# Patient Record
Sex: FEMALE | Race: BLACK OR AFRICAN AMERICAN | NOT HISPANIC OR LATINO | Employment: FULL TIME | URBAN - METROPOLITAN AREA
[De-identification: names, ages, dates, MRNs, and addresses within clinical notes are randomized per-mention and may not be internally consistent; named-entity substitution may affect disease eponyms.]

---

## 2019-01-18 ENCOUNTER — HISTORICAL (OUTPATIENT)
Dept: RADIOLOGY | Facility: HOSPITAL | Age: 39
End: 2019-01-18

## 2019-01-23 ENCOUNTER — HISTORICAL (OUTPATIENT)
Dept: LAB | Facility: HOSPITAL | Age: 39
End: 2019-01-23

## 2019-01-23 LAB
ABS NEUT (OLG): 8.8 X10(3)/MCL (ref 1.5–6.9)
BASOPHILS # BLD AUTO: 0 X10(3)/MCL (ref 0–0.1)
BASOPHILS NFR BLD AUTO: 0 % (ref 0–1)
EOSINOPHIL # BLD AUTO: 0 X10(3)/MCL (ref 0–0.6)
EOSINOPHIL NFR BLD AUTO: 0 % (ref 0–5)
ERYTHROCYTE [DISTWIDTH] IN BLOOD BY AUTOMATED COUNT: 12.4 % (ref 11.5–17)
HCT VFR BLD AUTO: 36.1 % (ref 36–48)
HGB BLD-MCNC: 11.5 GM/DL (ref 12–16)
IMM GRANULOCYTES # BLD AUTO: 0.05 10*3/UL (ref 0–0.02)
IMM GRANULOCYTES NFR BLD AUTO: 0.4 % (ref 0–0.43)
LYMPHOCYTES # BLD AUTO: 1.6 X10(3)/MCL (ref 0.5–4.1)
LYMPHOCYTES NFR BLD AUTO: 14 % (ref 15–40)
MCH RBC QN AUTO: 28 PG (ref 27–34)
MCHC RBC AUTO-ENTMCNC: 32 GM/DL (ref 31–36)
MCV RBC AUTO: 87 FL (ref 80–99)
MONOCYTES # BLD AUTO: 0.7 X10(3)/MCL (ref 0–1.1)
MONOCYTES NFR BLD AUTO: 7 % (ref 4–12)
NEUTROPHILS # BLD AUTO: 8.8 X10(3)/MCL (ref 1.5–6.9)
NEUTROPHILS NFR BLD AUTO: 78 % (ref 43–75)
PLATELET # BLD AUTO: 236 X10(3)/MCL (ref 140–400)
PMV BLD AUTO: 10.9 FL (ref 6.8–10)
RBC # BLD AUTO: 4.17 X10(6)/MCL (ref 4.2–5.4)
WBC # SPEC AUTO: 11.3 X10(3)/MCL (ref 4.5–11.5)

## 2019-02-20 ENCOUNTER — HISTORICAL (OUTPATIENT)
Dept: LAB | Facility: HOSPITAL | Age: 39
End: 2019-02-20

## 2019-02-20 LAB — ERYTHROCYTE [SEDIMENTATION RATE] IN BLOOD: 6 MM/HR (ref 0–20)

## 2023-10-26 ENCOUNTER — OFFICE VISIT (OUTPATIENT)
Dept: ORTHOPEDICS | Facility: CLINIC | Age: 43
End: 2023-10-26
Payer: COMMERCIAL

## 2023-10-26 ENCOUNTER — HOSPITAL ENCOUNTER (OUTPATIENT)
Dept: RADIOLOGY | Facility: CLINIC | Age: 43
Discharge: HOME | End: 2023-10-26
Attending: ORTHOPAEDIC SURGERY
Payer: COMMERCIAL

## 2023-10-26 VITALS — WEIGHT: 141 LBS | HEIGHT: 60 IN | BODY MASS INDEX: 27.68 KG/M2

## 2023-10-26 DIAGNOSIS — M25.511 CHRONIC RIGHT SHOULDER PAIN: ICD-10-CM

## 2023-10-26 DIAGNOSIS — S46.011A TRAUMATIC INCOMPLETE TEAR OF RIGHT ROTATOR CUFF, INITIAL ENCOUNTER: Primary | ICD-10-CM

## 2023-10-26 DIAGNOSIS — G89.29 CHRONIC RIGHT SHOULDER PAIN: ICD-10-CM

## 2023-10-26 DIAGNOSIS — M25.511 RIGHT SHOULDER PAIN, UNSPECIFIED CHRONICITY: ICD-10-CM

## 2023-10-26 PROCEDURE — 1159F PR MEDICATION LIST DOCUMENTED IN MEDICAL RECORD: ICD-10-PCS | Mod: CPTII,,, | Performed by: ORTHOPAEDIC SURGERY

## 2023-10-26 PROCEDURE — 99204 OFFICE O/P NEW MOD 45 MIN: CPT | Mod: ,,, | Performed by: ORTHOPAEDIC SURGERY

## 2023-10-26 PROCEDURE — 1160F PR REVIEW ALL MEDS BY PRESCRIBER/CLIN PHARMACIST DOCUMENTED: ICD-10-PCS | Mod: CPTII,,, | Performed by: ORTHOPAEDIC SURGERY

## 2023-10-26 PROCEDURE — 1159F MED LIST DOCD IN RCRD: CPT | Mod: CPTII,,, | Performed by: ORTHOPAEDIC SURGERY

## 2023-10-26 PROCEDURE — 99204 PR OFFICE/OUTPT VISIT, NEW, LEVL IV, 45-59 MIN: ICD-10-PCS | Mod: ,,, | Performed by: ORTHOPAEDIC SURGERY

## 2023-10-26 PROCEDURE — 73030 X-RAY EXAM OF SHOULDER: CPT | Mod: RT,,, | Performed by: ORTHOPAEDIC SURGERY

## 2023-10-26 PROCEDURE — 73030 XR SHOULDER COMPLETE 2 OR MORE VIEWS RIGHT: ICD-10-PCS | Mod: RT,,, | Performed by: ORTHOPAEDIC SURGERY

## 2023-10-26 PROCEDURE — 1160F RVW MEDS BY RX/DR IN RCRD: CPT | Mod: CPTII,,, | Performed by: ORTHOPAEDIC SURGERY

## 2023-10-26 PROCEDURE — 3008F BODY MASS INDEX DOCD: CPT | Mod: CPTII,,, | Performed by: ORTHOPAEDIC SURGERY

## 2023-10-26 PROCEDURE — 3008F PR BODY MASS INDEX (BMI) DOCUMENTED: ICD-10-PCS | Mod: CPTII,,, | Performed by: ORTHOPAEDIC SURGERY

## 2023-10-26 RX ORDER — BUSPIRONE HYDROCHLORIDE 5 MG/1
5 TABLET ORAL
COMMUNITY
Start: 2023-06-30

## 2023-10-26 RX ORDER — IBUPROFEN 800 MG/1
800 TABLET ORAL 3 TIMES DAILY PRN
Qty: 90 TABLET | Refills: 0 | Status: ON HOLD | OUTPATIENT
Start: 2023-10-26 | End: 2023-11-13 | Stop reason: HOSPADM

## 2023-10-26 RX ORDER — MONTELUKAST SODIUM 10 MG/1
1 TABLET ORAL DAILY PRN
COMMUNITY
Start: 2022-11-04

## 2023-10-26 RX ORDER — BUTALBITAL, ACETAMINOPHEN AND CAFFEINE 50; 325; 40 MG/1; MG/1; MG/1
1 TABLET ORAL EVERY 6 HOURS PRN
COMMUNITY
Start: 2023-06-30

## 2023-10-26 RX ORDER — TIZANIDINE 4 MG/1
4 TABLET ORAL EVERY 8 HOURS PRN
Qty: 42 TABLET | Refills: 0 | Status: SHIPPED | OUTPATIENT
Start: 2023-10-26 | End: 2023-11-09

## 2023-10-26 NOTE — LETTER
[facility]  [address] [place] [address]  Phone: [phone]  Fax: [phone]    Name:Renetta Kern  :1980   Date:10/26/2023     The above mentioned patient was seen by me on 10/26/23 and is able to return to work/school on 10/27/23 .         If you should have any questions, please contact my office at [phone].    Thank you,   Vaughn Morfin MD

## 2023-10-26 NOTE — PROGRESS NOTES
Orthopaedic Clinic  Orthopedic Clinic Note      Chief Complaint:   Chief Complaint   Patient presents with    Shoulder Pain     Right shoulder pain. No prior sx. Xr done today. 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 Pain is mainly in shoulder area and radiates down arm and states sometimes has numbness in the hand. Hurts to sleep on this shoulder and also to raise arm from side and reach back. Tried chiropractor which also did not give relief.     Referring Physician: No ref. provider found      History of Present Illness:    This is a 43 y.o. year old female presenting with complaints of right shoulder pain for a few years.  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  Her pain is most notable in the shoulder, however, it does radiate down her arm sometimes.  This shoulder pain is moderate to severe.  Patient reports increased pain with overhead movement.  Patient reports night pain.  Patient has been treated previously with multiple chiropractic manipulations, oral anti-inflammatories, and multiple oral corticosteroid prescriptions.  She remains symptomatic despite numerous conservative treatments.      History reviewed. No pertinent past medical history.    History reviewed. No pertinent surgical history.    Current Outpatient Medications   Medication Sig    busPIRone (BUSPAR) 5 MG Tab Take 5 mg by mouth 2 (two) times daily.    butalbital-acetaminophen-caffeine -40 mg (FIORICET, ESGIC) -40 mg per tablet Take 1 tablet by mouth every 6 (six) hours as needed.    montelukast (SINGULAIR) 10 mg tablet Take 1 tablet by mouth once daily.    ibuprofen (ADVIL,MOTRIN) 800 MG tablet Take 1 tablet (800 mg total) by mouth 3 (three) times daily as needed for Pain.    tiZANidine (ZANAFLEX) 4 MG tablet Take 1 tablet (4 mg total) by mouth every 8 (eight) hours  as needed (muscle spasms).     No current facility-administered medications for this visit.       Review of patient's allergies indicates:  No Known Allergies    Family History   Problem Relation Age of Onset    No Known Problems Mother     No Known Problems Father        Social History     Socioeconomic History    Marital status:    Tobacco Use    Smoking status: Never    Smokeless tobacco: Never   Substance and Sexual Activity    Alcohol use: Never    Drug use: Never           Review of Systems:  All review of systems negative except for those stated in the HPI.    Examination:    Vital Signs:    Vitals:    10/26/23 0832   Weight: 64 kg (141 lb)   Height: 5' (1.524 m)       Body mass index is 27.54 kg/m².    Physical Examination:  General: Well-developed, well-nourished.  Neuro: Alert and oriented x 3.  Psych: Normal mood and affect.  Card: Regular rate and rhythm  Resp: Respirations regular and unlabored  Right Shoulder Exam:  No obvious deformity. No medial or lateral scapula winging. Forward flexion to 140 degrees and abduction to 140 degrees. Positive empty can, positive Whipple, Positive drop arm test, Sawant, impingement, Positive AC joint tenderness. Negative biceps groove tenderness, Positive Talavera´s, Yergason´s, Speed test. Negative Apprehension and Relocation test. 4/5 strength, normal skin appearance and palpable pulses distally. Sensibility normal.       Imaging: X-rays ordered and images interpreted today personally by me of four views of the right shoulder demonstrate mild superior migration of the humeral head.      Assessment: Traumatic incomplete tear of right rotator cuff, initial encounter  -     X-ray Shoulder 2 or More Views Right; Future; Expected date: 10/26/2023  -     ibuprofen (ADVIL,MOTRIN) 800 MG tablet; Take 1 tablet (800 mg total) by mouth 3 (three) times daily as needed for Pain.  Dispense: 90 tablet; Refill: 0  -     tiZANidine (ZANAFLEX) 4 MG tablet; Take 1 tablet (4 mg  total) by mouth every 8 (eight) hours as needed (muscle spasms).  Dispense: 42 tablet; Refill: 0  -     MRI Shoulder Without Contrast Right; Future; Expected date: 11/02/2023    Chronic right shoulder pain  -     MRI Shoulder Without Contrast Right; Future; Expected date: 11/02/2023        Plan:  X-rays were reviewed with the patient.  Due to the chronic nature of her symptoms and failure to respond to conservative treatments, order entered for MRI of the right shoulder to assess for suspected rotator cuff pathology.  Prescriptions routed for ibuprofen 800 mg to be taken as needed for pain and tizanidine to be taken as needed for muscle spasms.  Patient was advised to try tizanidine to assess for drowsiness prior to attempting to take this medication when needing to drive.  She will avoid all other over-the-counter anti-inflammatories.  She will return to clinic for review of MRI results once imaging is obtained.  She verbalized understanding of the plan of care with no further questions.    Vaughn Morfin MD personally performed the services described in this documentation, including but not limited to patient's history, physical examination, and assessment and plan of care. All medical record entries made by KELSEY Rodrigez were performed at his direction and in his presence. The medical record was reviewed and is accurate and complete.         Follow up for Review of MRI results.      DISCLAIMER: This note may have been dictated using voice recognition software and may contain grammatical errors.     NOTE: Consult report sent to referring provider via [employer].

## 2023-11-07 ENCOUNTER — LAB VISIT (OUTPATIENT)
Dept: LAB | Facility: HOSPITAL | Age: 43
End: 2023-11-07
Attending: ORTHOPAEDIC SURGERY
Payer: COMMERCIAL

## 2023-11-07 ENCOUNTER — ANESTHESIA EVENT (OUTPATIENT)
Dept: SURGERY | Facility: HOSPITAL | Age: 43
End: 2023-11-07
Payer: COMMERCIAL

## 2023-11-07 ENCOUNTER — OFFICE VISIT (OUTPATIENT)
Dept: ORTHOPEDICS | Facility: CLINIC | Age: 43
End: 2023-11-07
Payer: COMMERCIAL

## 2023-11-07 VITALS — WEIGHT: 141 LBS | BODY MASS INDEX: 27.68 KG/M2 | HEIGHT: 60 IN

## 2023-11-07 DIAGNOSIS — M19.011 ARTHROSIS OF RIGHT ACROMIOCLAVICULAR JOINT: ICD-10-CM

## 2023-11-07 DIAGNOSIS — M75.21 RIGHT BICIPITAL TENOSYNOVITIS: ICD-10-CM

## 2023-11-07 DIAGNOSIS — S46.011A TRAUMATIC INCOMPLETE TEAR OF RIGHT ROTATOR CUFF, INITIAL ENCOUNTER: Primary | ICD-10-CM

## 2023-11-07 DIAGNOSIS — S46.011A TRAUMATIC INCOMPLETE TEAR OF RIGHT ROTATOR CUFF, INITIAL ENCOUNTER: ICD-10-CM

## 2023-11-07 LAB
ALBUMIN SERPL-MCNC: 4 G/DL (ref 3.5–5)
ALBUMIN/GLOB SERPL: 1 RATIO (ref 1.1–2)
ALP SERPL-CCNC: 60 UNIT/L (ref 40–150)
ALT SERPL-CCNC: 27 UNIT/L (ref 0–55)
AST SERPL-CCNC: 21 UNIT/L (ref 5–34)
BASOPHILS # BLD AUTO: 0.04 X10(3)/MCL
BASOPHILS NFR BLD AUTO: 0.4 %
BILIRUB SERPL-MCNC: 0.8 MG/DL
BUN SERPL-MCNC: 11.5 MG/DL (ref 7–18.7)
CALCIUM SERPL-MCNC: 8.9 MG/DL (ref 8.4–10.2)
CHLORIDE SERPL-SCNC: 107 MMOL/L (ref 98–107)
CO2 SERPL-SCNC: 24 MMOL/L (ref 22–29)
CREAT SERPL-MCNC: 0.71 MG/DL (ref 0.55–1.02)
EOSINOPHIL # BLD AUTO: 0.02 X10(3)/MCL (ref 0–0.9)
EOSINOPHIL NFR BLD AUTO: 0.2 %
ERYTHROCYTE [DISTWIDTH] IN BLOOD BY AUTOMATED COUNT: 11.7 % (ref 11.5–17)
GFR SERPLBLD CREATININE-BSD FMLA CKD-EPI: >60 MLS/MIN/1.73/M2
GLOBULIN SER-MCNC: 3.9 GM/DL (ref 2.4–3.5)
GLUCOSE SERPL-MCNC: 87 MG/DL (ref 74–100)
HCT VFR BLD AUTO: 37.6 % (ref 37–47)
HGB BLD-MCNC: 12.4 G/DL (ref 12–16)
IMM GRANULOCYTES # BLD AUTO: 0.04 X10(3)/MCL (ref 0–0.04)
IMM GRANULOCYTES NFR BLD AUTO: 0.4 %
LYMPHOCYTES # BLD AUTO: 2.34 X10(3)/MCL (ref 0.6–4.6)
LYMPHOCYTES NFR BLD AUTO: 22.1 %
MCH RBC QN AUTO: 27.6 PG (ref 27–31)
MCHC RBC AUTO-ENTMCNC: 33 G/DL (ref 33–36)
MCV RBC AUTO: 83.7 FL (ref 80–94)
MONOCYTES # BLD AUTO: 0.91 X10(3)/MCL (ref 0.1–1.3)
MONOCYTES NFR BLD AUTO: 8.6 %
NEUTROPHILS # BLD AUTO: 7.26 X10(3)/MCL (ref 2.1–9.2)
NEUTROPHILS NFR BLD AUTO: 68.3 %
NRBC BLD AUTO-RTO: 0 %
PLATELET # BLD AUTO: 242 X10(3)/MCL (ref 130–400)
PMV BLD AUTO: 10.3 FL (ref 7.4–10.4)
POTASSIUM SERPL-SCNC: 3.9 MMOL/L (ref 3.5–5.1)
PROT SERPL-MCNC: 7.9 GM/DL (ref 6.4–8.3)
RBC # BLD AUTO: 4.49 X10(6)/MCL (ref 4.2–5.4)
SODIUM SERPL-SCNC: 138 MMOL/L (ref 136–145)
WBC # SPEC AUTO: 10.61 X10(3)/MCL (ref 4.5–11.5)

## 2023-11-07 PROCEDURE — 85025 COMPLETE CBC W/AUTO DIFF WBC: CPT

## 2023-11-07 PROCEDURE — 36415 COLL VENOUS BLD VENIPUNCTURE: CPT

## 2023-11-07 PROCEDURE — 80053 COMPREHEN METABOLIC PANEL: CPT

## 2023-11-07 PROCEDURE — 3008F PR BODY MASS INDEX (BMI) DOCUMENTED: ICD-10-PCS | Mod: CPTII,,, | Performed by: ORTHOPAEDIC SURGERY

## 2023-11-07 PROCEDURE — 99214 PR OFFICE/OUTPT VISIT, EST, LEVL IV, 30-39 MIN: ICD-10-PCS | Mod: ,,, | Performed by: ORTHOPAEDIC SURGERY

## 2023-11-07 PROCEDURE — 99214 OFFICE O/P EST MOD 30 MIN: CPT | Mod: ,,, | Performed by: ORTHOPAEDIC SURGERY

## 2023-11-07 PROCEDURE — 3008F BODY MASS INDEX DOCD: CPT | Mod: CPTII,,, | Performed by: ORTHOPAEDIC SURGERY

## 2023-11-07 RX ORDER — KETOROLAC TROMETHAMINE 10 MG/1
10 TABLET, FILM COATED ORAL ONCE
Status: CANCELLED | OUTPATIENT
Start: 2023-11-07 | End: 2023-11-12

## 2023-11-07 RX ORDER — SCOLOPAMINE TRANSDERMAL SYSTEM 1 MG/1
1 PATCH, EXTENDED RELEASE TRANSDERMAL ONCE AS NEEDED
Status: CANCELLED | OUTPATIENT
Start: 2023-11-07 | End: 2035-04-05

## 2023-11-07 RX ORDER — ALBUTEROL SULFATE 0.63 MG/3ML
0.63 SOLUTION RESPIRATORY (INHALATION) EVERY 6 HOURS PRN
COMMUNITY

## 2023-11-07 RX ORDER — ACETAMINOPHEN 500 MG
1000 TABLET ORAL
Status: CANCELLED | OUTPATIENT
Start: 2023-11-07

## 2023-11-07 RX ORDER — SODIUM CHLORIDE, SODIUM GLUCONATE, SODIUM ACETATE, POTASSIUM CHLORIDE AND MAGNESIUM CHLORIDE 30; 37; 368; 526; 502 MG/100ML; MG/100ML; MG/100ML; MG/100ML; MG/100ML
INJECTION, SOLUTION INTRAVENOUS CONTINUOUS
Status: CANCELLED | OUTPATIENT
Start: 2023-11-07

## 2023-11-07 RX ORDER — GABAPENTIN 100 MG/1
300 CAPSULE ORAL
Status: CANCELLED | OUTPATIENT
Start: 2023-11-07

## 2023-11-07 NOTE — PROGRESS NOTES
Orthopaedic Clinic  Orthopedic Clinic Note      Chief Complaint:   Chief Complaint   Patient presents with    Results     Mri results right shoulder.      Referring Physician: No ref. provider found      History of Present Illness:    This is a 43 y.o. year old female presenting with complaints of right shoulder pain for a few years.  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  Her pain is most notable in the shoulder, however, it does radiate down her arm sometimes.  This shoulder pain is moderate to severe.  Patient reports increased pain with overhead movement.  Patient reports night pain.  Patient has been treated previously with multiple chiropractic manipulations, oral anti-inflammatories, and multiple oral corticosteroid prescriptions.  She remains symptomatic despite numerous conservative treatments.  11/07/2023 Patient presents for review of right shoulder MRI results.  MRI of the right shoulder demonstrated rotator cuff thinning with interstitial tearing and tendinosis, as well as bicipital tendonitis.  Her symptoms are unchanged since her prior visit.      History reviewed. No pertinent past medical history.    History reviewed. No pertinent surgical history.    Current Outpatient Medications   Medication Sig    busPIRone (BUSPAR) 5 MG Tab Take 5 mg by mouth 2 (two) times daily.    butalbital-acetaminophen-caffeine -40 mg (FIORICET, ESGIC) -40 mg per tablet Take 1 tablet by mouth every 6 (six) hours as needed.    ibuprofen (ADVIL,MOTRIN) 800 MG tablet Take 1 tablet (800 mg total) by mouth 3 (three) times daily as needed for Pain.    montelukast (SINGULAIR) 10 mg tablet Take 1 tablet by mouth once daily.    tiZANidine (ZANAFLEX) 4 MG tablet Take 1 tablet (4 mg total) by mouth every 8 (eight) hours as needed (muscle spasms).     No current facility-administered medications for this visit.       Review  of patient's allergies indicates:   Allergen Reactions    Iodine Rash and Other (See Comments)     Blisters       Family History   Problem Relation Age of Onset    No Known Problems Mother     No Known Problems Father        Social History     Socioeconomic History    Marital status:    Tobacco Use    Smoking status: Never    Smokeless tobacco: Never   Substance and Sexual Activity    Alcohol use: Never    Drug use: Never           Review of Systems:  All review of systems negative except for those stated in the HPI.    Examination:    Vital Signs:    Vitals:    11/07/23 1058   Weight: 64 kg (141 lb)   Height: 5' (1.524 m)       Body mass index is 27.54 kg/m².    Physical Examination:  General: Well-developed, well-nourished.  Neuro: Alert and oriented x 3.  Psych: Normal mood and affect.  Card: Regular rate and rhythm  Resp: Respirations regular and unlabored  Right Shoulder Exam:  No obvious deformity. No medial or lateral scapula winging. Forward flexion to 140 degrees and abduction to 140 degrees. Positive empty can, positive Whipple, Positive drop arm test, Sawant, impingement, Positive AC joint tenderness. Negative biceps groove tenderness, Positive Talavera´s, Yergason´s, Speed test. Negative Apprehension and Relocation test. 4/5 strength, normal skin appearance and palpable pulses distally. Sensibility normal.       Imaging: Prior four views of the right shoulder demonstrate mild superior migration of the humeral head.      Assessment: Traumatic incomplete tear of right rotator cuff, initial encounter  -     Place in Outpatient; Standing  -     Vital signs; Standing  -     Insert peripheral IV; Standing  -     Clip and Prep Other (please specifiy) (Operative site); Standing  -     Cleanse with Chlorhexidine (CHG); Standing  -     Diet NPO; Standing  -     POCT glucose; Standing  -     CBC auto differential; Future  -     Comprehensive metabolic panel; Future  -     Inpatient consult to Anesthesiology;  Standing  -     Case Request Operating Room: DEBRIDEMENT, ROTATOR CUFF, ARTHROSCOPIC, ARTHROSCOPY, SHOULDER, WITH DISTAL CLAVICLE EXCISION, ARTHROSCOPY,SHOULDER,WITH BICEPS TENODESIS  -     Place sequential compression device; Standing    Right bicipital tenosynovitis  -     Place in Outpatient; Standing  -     Vital signs; Standing  -     Insert peripheral IV; Standing  -     Clip and Prep Other (please specifiy) (Operative site); Standing  -     Cleanse with Chlorhexidine (CHG); Standing  -     Diet NPO; Standing  -     POCT glucose; Standing  -     CBC auto differential; Future  -     Comprehensive metabolic panel; Future  -     Inpatient consult to Anesthesiology; Standing  -     Case Request Operating Room: DEBRIDEMENT, ROTATOR CUFF, ARTHROSCOPIC, ARTHROSCOPY, SHOULDER, WITH DISTAL CLAVICLE EXCISION, ARTHROSCOPY,SHOULDER,WITH BICEPS TENODESIS  -     Place sequential compression device; Standing    Arthrosis of right acromioclavicular joint  -     Place in Outpatient; Standing  -     Vital signs; Standing  -     Insert peripheral IV; Standing  -     Clip and Prep Other (please specifiy) (Operative site); Standing  -     Cleanse with Chlorhexidine (CHG); Standing  -     Diet NPO; Standing  -     POCT glucose; Standing  -     CBC auto differential; Future  -     Comprehensive metabolic panel; Future  -     Inpatient consult to Anesthesiology; Standing  -     Case Request Operating Room: DEBRIDEMENT, ROTATOR CUFF, ARTHROSCOPIC, ARTHROSCOPY, SHOULDER, WITH DISTAL CLAVICLE EXCISION, ARTHROSCOPY,SHOULDER,WITH BICEPS TENODESIS  -     Place sequential compression device; Standing    Other orders  -     electrolyte-A infusion  -     IP VTE LOW RISK PATIENT; Standing  -     ceFAZolin (ANCEF) 2,000 mg in dextrose 5 % (D5W) 50 mL IVPB  -     acetaminophen tablet 1,000 mg  -     ketorolac tablet 10 mg  -     gabapentin capsule 300 mg  -     scopolamine 1.3-1.5 mg (1 mg over 3 days) 1 patch        Plan:  After much discussion  she is decided she wants to proceed with surgical intervention.  We will proceed with right shoulder arthroscopy procedure to include rotator cuff debridement due to the tendinosis and possible takedown repair due to the severe thinning of the rotator cuff measuring 2.2 mm, possible biceps tenodesis and distal clavicle resection. A surgical video explaining the surgery and the risk was reviewed by the patient.  The surgical procedure was explained to the patient in detail.  The patient acknowledges that they understood the risks, benefits, and other alternatives.  Patient signed an informed consent.  This patient will need a postoperative abduction pillow sling to unload the rotator cuff, labrum, and biceps tendon repair so that patient can heal properly.    Vaughn Morfin MD personally performed the services described in this documentation, including but not limited to patient's history, physical examination, and assessment and plan of care. All medical record entries made by KELSEY Rodrigez were performed at his direction and in his presence. The medical record was reviewed and is accurate and complete.         No follow-ups on file.      DISCLAIMER: This note may have been dictated using voice recognition software and may contain grammatical errors.     NOTE: Consult report sent to referring provider via [employer].

## 2023-11-07 NOTE — H&P (VIEW-ONLY)
Orthopaedic Clinic  Orthopedic Clinic Note      Chief Complaint:   Chief Complaint   Patient presents with    Results     Mri results right shoulder.      Referring Physician: No ref. provider found      History of Present Illness:    This is a 43 y.o. year old female presenting with complaints of right shoulder pain for a few years.  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  Her pain is most notable in the shoulder, however, it does radiate down her arm sometimes.  This shoulder pain is moderate to severe.  Patient reports increased pain with overhead movement.  Patient reports night pain.  Patient has been treated previously with multiple chiropractic manipulations, oral anti-inflammatories, and multiple oral corticosteroid prescriptions.  She remains symptomatic despite numerous conservative treatments.  11/07/2023 Patient presents for review of right shoulder MRI results.  MRI of the right shoulder demonstrated rotator cuff thinning with interstitial tearing and tendinosis, as well as bicipital tendonitis.  Her symptoms are unchanged since her prior visit.      History reviewed. No pertinent past medical history.    History reviewed. No pertinent surgical history.    Current Outpatient Medications   Medication Sig    busPIRone (BUSPAR) 5 MG Tab Take 5 mg by mouth 2 (two) times daily.    butalbital-acetaminophen-caffeine -40 mg (FIORICET, ESGIC) -40 mg per tablet Take 1 tablet by mouth every 6 (six) hours as needed.    ibuprofen (ADVIL,MOTRIN) 800 MG tablet Take 1 tablet (800 mg total) by mouth 3 (three) times daily as needed for Pain.    montelukast (SINGULAIR) 10 mg tablet Take 1 tablet by mouth once daily.    tiZANidine (ZANAFLEX) 4 MG tablet Take 1 tablet (4 mg total) by mouth every 8 (eight) hours as needed (muscle spasms).     No current facility-administered medications for this visit.       Review  of patient's allergies indicates:   Allergen Reactions    Iodine Rash and Other (See Comments)     Blisters       Family History   Problem Relation Age of Onset    No Known Problems Mother     No Known Problems Father        Social History     Socioeconomic History    Marital status:    Tobacco Use    Smoking status: Never    Smokeless tobacco: Never   Substance and Sexual Activity    Alcohol use: Never    Drug use: Never           Review of Systems:  All review of systems negative except for those stated in the HPI.    Examination:    Vital Signs:    Vitals:    11/07/23 1058   Weight: 64 kg (141 lb)   Height: 5' (1.524 m)       Body mass index is 27.54 kg/m².    Physical Examination:  General: Well-developed, well-nourished.  Neuro: Alert and oriented x 3.  Psych: Normal mood and affect.  Card: Regular rate and rhythm  Resp: Respirations regular and unlabored  Right Shoulder Exam:  No obvious deformity. No medial or lateral scapula winging. Forward flexion to 140 degrees and abduction to 140 degrees. Positive empty can, positive Whipple, Positive drop arm test, Sawant, impingement, Positive AC joint tenderness. Negative biceps groove tenderness, Positive Talavera´s, Yergason´s, Speed test. Negative Apprehension and Relocation test. 4/5 strength, normal skin appearance and palpable pulses distally. Sensibility normal.       Imaging: Prior four views of the right shoulder demonstrate mild superior migration of the humeral head.      Assessment: Traumatic incomplete tear of right rotator cuff, initial encounter  -     Place in Outpatient; Standing  -     Vital signs; Standing  -     Insert peripheral IV; Standing  -     Clip and Prep Other (please specifiy) (Operative site); Standing  -     Cleanse with Chlorhexidine (CHG); Standing  -     Diet NPO; Standing  -     POCT glucose; Standing  -     CBC auto differential; Future  -     Comprehensive metabolic panel; Future  -     Inpatient consult to Anesthesiology;  Standing  -     Case Request Operating Room: DEBRIDEMENT, ROTATOR CUFF, ARTHROSCOPIC, ARTHROSCOPY, SHOULDER, WITH DISTAL CLAVICLE EXCISION, ARTHROSCOPY,SHOULDER,WITH BICEPS TENODESIS  -     Place sequential compression device; Standing    Right bicipital tenosynovitis  -     Place in Outpatient; Standing  -     Vital signs; Standing  -     Insert peripheral IV; Standing  -     Clip and Prep Other (please specifiy) (Operative site); Standing  -     Cleanse with Chlorhexidine (CHG); Standing  -     Diet NPO; Standing  -     POCT glucose; Standing  -     CBC auto differential; Future  -     Comprehensive metabolic panel; Future  -     Inpatient consult to Anesthesiology; Standing  -     Case Request Operating Room: DEBRIDEMENT, ROTATOR CUFF, ARTHROSCOPIC, ARTHROSCOPY, SHOULDER, WITH DISTAL CLAVICLE EXCISION, ARTHROSCOPY,SHOULDER,WITH BICEPS TENODESIS  -     Place sequential compression device; Standing    Arthrosis of right acromioclavicular joint  -     Place in Outpatient; Standing  -     Vital signs; Standing  -     Insert peripheral IV; Standing  -     Clip and Prep Other (please specifiy) (Operative site); Standing  -     Cleanse with Chlorhexidine (CHG); Standing  -     Diet NPO; Standing  -     POCT glucose; Standing  -     CBC auto differential; Future  -     Comprehensive metabolic panel; Future  -     Inpatient consult to Anesthesiology; Standing  -     Case Request Operating Room: DEBRIDEMENT, ROTATOR CUFF, ARTHROSCOPIC, ARTHROSCOPY, SHOULDER, WITH DISTAL CLAVICLE EXCISION, ARTHROSCOPY,SHOULDER,WITH BICEPS TENODESIS  -     Place sequential compression device; Standing    Other orders  -     electrolyte-A infusion  -     IP VTE LOW RISK PATIENT; Standing  -     ceFAZolin (ANCEF) 2,000 mg in dextrose 5 % (D5W) 50 mL IVPB  -     acetaminophen tablet 1,000 mg  -     ketorolac tablet 10 mg  -     gabapentin capsule 300 mg  -     scopolamine 1.3-1.5 mg (1 mg over 3 days) 1 patch        Plan:  After much discussion  she is decided she wants to proceed with surgical intervention.  We will proceed with right shoulder arthroscopy procedure to include rotator cuff debridement due to the tendinosis and possible takedown repair due to the severe thinning of the rotator cuff measuring 2.2 mm, possible biceps tenodesis and distal clavicle resection. A surgical video explaining the surgery and the risk was reviewed by the patient.  The surgical procedure was explained to the patient in detail.  The patient acknowledges that they understood the risks, benefits, and other alternatives.  Patient signed an informed consent.  This patient will need a postoperative abduction pillow sling to unload the rotator cuff, labrum, and biceps tendon repair so that patient can heal properly.    Vaughn Morfin MD personally performed the services described in this documentation, including but not limited to patient's history, physical examination, and assessment and plan of care. All medical record entries made by KELSEY Rodrigez were performed at his direction and in his presence. The medical record was reviewed and is accurate and complete.         No follow-ups on file.      DISCLAIMER: This note may have been dictated using voice recognition software and may contain grammatical errors.     NOTE: Consult report sent to referring provider via [employer].

## 2023-11-07 NOTE — LETTER
November 7, 2023       Orthopaedic Clinic  [address], [address]  [facility] [address]  Phone: [phone]  Fax: [phone]       Patient: Renetta Kern   YOB: 1980  Date of Visit: 11/07/2023    To Whom It May Concern:    Dimple Kern  was at [facility] on 11/07/2023. The patient may return to work on 11/8/2023 with no restrictions. If you have any questions or concerns, or if I can be of further assistance, please do not hesitate to contact me.    Sincerely,    Dr Vaughn Morfin MD

## 2023-11-11 ENCOUNTER — PATIENT MESSAGE (OUTPATIENT)
Dept: ADMINISTRATIVE | Facility: OTHER | Age: 43
End: 2023-11-11
Payer: COMMERCIAL

## 2023-11-13 ENCOUNTER — ANESTHESIA (OUTPATIENT)
Dept: SURGERY | Facility: HOSPITAL | Age: 43
End: 2023-11-13
Payer: COMMERCIAL

## 2023-11-13 ENCOUNTER — HOSPITAL ENCOUNTER (OUTPATIENT)
Facility: HOSPITAL | Age: 43
Discharge: HOME | End: 2023-11-13
Attending: ORTHOPAEDIC SURGERY | Admitting: ORTHOPAEDIC SURGERY
Payer: COMMERCIAL

## 2023-11-13 DIAGNOSIS — M75.21 RIGHT BICIPITAL TENOSYNOVITIS: ICD-10-CM

## 2023-11-13 DIAGNOSIS — S46.011A TRAUMATIC INCOMPLETE TEAR OF RIGHT ROTATOR CUFF, INITIAL ENCOUNTER: ICD-10-CM

## 2023-11-13 DIAGNOSIS — S46.011A TRAUMATIC INCOMPLETE TEAR OF RIGHT ROTATOR CUFF, INITIAL ENCOUNTER: Primary | ICD-10-CM

## 2023-11-13 DIAGNOSIS — M19.011 ARTHROSIS OF RIGHT ACROMIOCLAVICULAR JOINT: ICD-10-CM

## 2023-11-13 DIAGNOSIS — Z98.890 STATUS POST ARTHROSCOPY OF RIGHT SHOULDER: Primary | ICD-10-CM

## 2023-11-13 LAB
B-HCG UR QL: NEGATIVE
CTP QC/QA: YES

## 2023-11-13 PROCEDURE — 29828 SHO ARTHRS SRG BICP TENODSIS: CPT | Mod: RT,,, | Performed by: ORTHOPAEDIC SURGERY

## 2023-11-13 PROCEDURE — 63600175 PHARM REV CODE 636 W HCPCS: Performed by: NURSE ANESTHETIST, CERTIFIED REGISTERED

## 2023-11-13 PROCEDURE — D9220A PRA ANESTHESIA: ICD-10-PCS | Mod: CRNA,,, | Performed by: NURSE ANESTHETIST, CERTIFIED REGISTERED

## 2023-11-13 PROCEDURE — 63600175 PHARM REV CODE 636 W HCPCS: Performed by: ANESTHESIOLOGY

## 2023-11-13 PROCEDURE — D9220A PRA ANESTHESIA: Mod: ANES,,, | Performed by: ANESTHESIOLOGY

## 2023-11-13 PROCEDURE — 64415 NJX AA&/STRD BRCH PLXS IMG: CPT | Performed by: ANESTHESIOLOGY

## 2023-11-13 PROCEDURE — 36000710: Performed by: ORTHOPAEDIC SURGERY

## 2023-11-13 PROCEDURE — 71000016 HC POSTOP RECOV ADDL HR: Performed by: ORTHOPAEDIC SURGERY

## 2023-11-13 PROCEDURE — 29824 PR SHLDR ARTHROSCOP,SURG,DIS CLAVICULECTOMY: ICD-10-PCS | Mod: 51,RT,, | Performed by: ORTHOPAEDIC SURGERY

## 2023-11-13 PROCEDURE — D9220A PRA ANESTHESIA: Mod: CRNA,,, | Performed by: NURSE ANESTHETIST, CERTIFIED REGISTERED

## 2023-11-13 PROCEDURE — 25000003 PHARM REV CODE 250

## 2023-11-13 PROCEDURE — 81025 URINE PREGNANCY TEST: CPT | Performed by: ORTHOPAEDIC SURGERY

## 2023-11-13 PROCEDURE — C1713 ANCHOR/SCREW BN/BN,TIS/BN: HCPCS | Performed by: ORTHOPAEDIC SURGERY

## 2023-11-13 PROCEDURE — 71000033 HC RECOVERY, INTIAL HOUR: Performed by: ORTHOPAEDIC SURGERY

## 2023-11-13 PROCEDURE — 29824 SHO ARTHRS SRG DSTL CLAVICLC: CPT | Mod: 51,RT,, | Performed by: ORTHOPAEDIC SURGERY

## 2023-11-13 PROCEDURE — 37000008 HC ANESTHESIA 1ST 15 MINUTES: Performed by: ORTHOPAEDIC SURGERY

## 2023-11-13 PROCEDURE — 29828 PR ARTHROSCOPY SHOULDER SURGICAL BICEPS TENODESIS: ICD-10-PCS | Mod: RT,,, | Performed by: ORTHOPAEDIC SURGERY

## 2023-11-13 PROCEDURE — 27201423 OPTIME MED/SURG SUP & DEVICES STERILE SUPPLY: Performed by: ORTHOPAEDIC SURGERY

## 2023-11-13 PROCEDURE — 36000711: Performed by: ORTHOPAEDIC SURGERY

## 2023-11-13 PROCEDURE — 37000009 HC ANESTHESIA EA ADD 15 MINS: Performed by: ORTHOPAEDIC SURGERY

## 2023-11-13 PROCEDURE — 63600175 PHARM REV CODE 636 W HCPCS

## 2023-11-13 PROCEDURE — 71000015 HC POSTOP RECOV 1ST HR: Performed by: ORTHOPAEDIC SURGERY

## 2023-11-13 PROCEDURE — 64415 PERIPHERAL BLOCK: ICD-10-PCS | Mod: 59,RT,, | Performed by: ANESTHESIOLOGY

## 2023-11-13 PROCEDURE — D9220A PRA ANESTHESIA: ICD-10-PCS | Mod: ANES,,, | Performed by: ANESTHESIOLOGY

## 2023-11-13 PROCEDURE — 25000003 PHARM REV CODE 250: Performed by: NURSE ANESTHETIST, CERTIFIED REGISTERED

## 2023-11-13 DEVICE — LNT IMPLANT SYSTEM, 3.9 BC SWIVELOCK
Type: IMPLANTABLE DEVICE | Site: SHOULDER | Status: FUNCTIONAL
Brand: ARTHREX®

## 2023-11-13 RX ORDER — ONDANSETRON 4 MG/1
4 TABLET, ORALLY DISINTEGRATING ORAL EVERY 6 HOURS PRN
Qty: 30 TABLET | Refills: 0 | Status: SHIPPED | OUTPATIENT
Start: 2023-11-13 | End: 2023-11-23

## 2023-11-13 RX ORDER — BUPIVACAINE HYDROCHLORIDE 5 MG/ML
INJECTION, SOLUTION EPIDURAL; INTRACAUDAL
Status: DISCONTINUED
Start: 2023-11-13 | End: 2023-11-13 | Stop reason: HOSPADM

## 2023-11-13 RX ORDER — ROPIVACAINE HYDROCHLORIDE 5 MG/ML
INJECTION, SOLUTION EPIDURAL; INFILTRATION; PERINEURAL
Status: COMPLETED
Start: 2023-11-13 | End: 2023-11-13

## 2023-11-13 RX ORDER — DIPHENHYDRAMINE HYDROCHLORIDE 50 MG/ML
25 INJECTION INTRAMUSCULAR; INTRAVENOUS EVERY 6 HOURS PRN
Status: CANCELLED | OUTPATIENT
Start: 2023-11-13

## 2023-11-13 RX ORDER — ACETAMINOPHEN 10 MG/ML
1000 INJECTION, SOLUTION INTRAVENOUS ONCE
Status: CANCELLED | OUTPATIENT
Start: 2023-11-13 | End: 2023-11-13

## 2023-11-13 RX ORDER — METHOCARBAMOL 750 MG/1
750 TABLET, FILM COATED ORAL EVERY 6 HOURS
Qty: 56 TABLET | Refills: 0 | Status: SHIPPED | OUTPATIENT
Start: 2023-11-13 | End: 2023-11-27

## 2023-11-13 RX ORDER — MIDAZOLAM HYDROCHLORIDE 1 MG/ML
2 INJECTION INTRAMUSCULAR; INTRAVENOUS EVERY 5 MIN PRN
Status: DISCONTINUED | OUTPATIENT
Start: 2023-11-13 | End: 2023-11-13 | Stop reason: HOSPADM

## 2023-11-13 RX ORDER — METOCLOPRAMIDE HYDROCHLORIDE 5 MG/ML
10 INJECTION INTRAMUSCULAR; INTRAVENOUS EVERY 6 HOURS PRN
Status: DISCONTINUED | OUTPATIENT
Start: 2023-11-13 | End: 2023-11-13 | Stop reason: HOSPADM

## 2023-11-13 RX ORDER — ONDANSETRON 2 MG/ML
INJECTION INTRAMUSCULAR; INTRAVENOUS
Status: DISCONTINUED | OUTPATIENT
Start: 2023-11-13 | End: 2023-11-13

## 2023-11-13 RX ORDER — ROCURONIUM BROMIDE 10 MG/ML
INJECTION, SOLUTION INTRAVENOUS
Status: DISCONTINUED | OUTPATIENT
Start: 2023-11-13 | End: 2023-11-13

## 2023-11-13 RX ORDER — FENTANYL CITRATE 50 UG/ML
INJECTION, SOLUTION INTRAMUSCULAR; INTRAVENOUS
Status: DISCONTINUED | OUTPATIENT
Start: 2023-11-13 | End: 2023-11-13

## 2023-11-13 RX ORDER — LIDOCAINE HYDROCHLORIDE AND EPINEPHRINE 20; 10 MG/ML; UG/ML
INJECTION, SOLUTION INFILTRATION; PERINEURAL
Status: DISCONTINUED
Start: 2023-11-13 | End: 2023-11-13 | Stop reason: WASHOUT

## 2023-11-13 RX ORDER — HYDROMORPHONE HYDROCHLORIDE 2 MG/ML
0.2 INJECTION, SOLUTION INTRAMUSCULAR; INTRAVENOUS; SUBCUTANEOUS EVERY 5 MIN PRN
Status: CANCELLED | OUTPATIENT
Start: 2023-11-13

## 2023-11-13 RX ORDER — PROPOFOL 10 MG/ML
VIAL (ML) INTRAVENOUS
Status: DISCONTINUED | OUTPATIENT
Start: 2023-11-13 | End: 2023-11-13

## 2023-11-13 RX ORDER — LIDOCAINE HYDROCHLORIDE 10 MG/ML
1 INJECTION, SOLUTION EPIDURAL; INFILTRATION; INTRACAUDAL; PERINEURAL ONCE
Status: CANCELLED | OUTPATIENT
Start: 2023-11-13 | End: 2023-11-13

## 2023-11-13 RX ORDER — ROPIVACAINE HYDROCHLORIDE 5 MG/ML
INJECTION, SOLUTION EPIDURAL; INFILTRATION; PERINEURAL
Status: DISCONTINUED | OUTPATIENT
Start: 2023-11-13 | End: 2023-11-13

## 2023-11-13 RX ORDER — GABAPENTIN 300 MG/1
300 CAPSULE ORAL
Status: COMPLETED | OUTPATIENT
Start: 2023-11-13 | End: 2023-11-13

## 2023-11-13 RX ORDER — SODIUM CHLORIDE, SODIUM GLUCONATE, SODIUM ACETATE, POTASSIUM CHLORIDE AND MAGNESIUM CHLORIDE 30; 37; 368; 526; 502 MG/100ML; MG/100ML; MG/100ML; MG/100ML; MG/100ML
INJECTION, SOLUTION INTRAVENOUS CONTINUOUS
Status: DISCONTINUED | OUTPATIENT
Start: 2023-11-13 | End: 2023-11-13 | Stop reason: HOSPADM

## 2023-11-13 RX ORDER — MAG HYDROX/ALUMINUM HYD/SIMETH 200-200-20
30 SUSPENSION, ORAL (FINAL DOSE FORM) ORAL EVERY 6 HOURS PRN
Status: DISCONTINUED | OUTPATIENT
Start: 2023-11-13 | End: 2023-11-13 | Stop reason: HOSPADM

## 2023-11-13 RX ORDER — DEXAMETHASONE SODIUM PHOSPHATE 4 MG/ML
INJECTION, SOLUTION INTRA-ARTICULAR; INTRALESIONAL; INTRAMUSCULAR; INTRAVENOUS; SOFT TISSUE
Status: DISCONTINUED | OUTPATIENT
Start: 2023-11-13 | End: 2023-11-13

## 2023-11-13 RX ORDER — SCOLOPAMINE TRANSDERMAL SYSTEM 1 MG/1
1 PATCH, EXTENDED RELEASE TRANSDERMAL ONCE AS NEEDED
Status: DISCONTINUED | OUTPATIENT
Start: 2023-11-13 | End: 2023-11-13 | Stop reason: HOSPADM

## 2023-11-13 RX ORDER — OXYCODONE HYDROCHLORIDE 5 MG/1
5 TABLET ORAL EVERY 12 HOURS PRN
Qty: 14 TABLET | Refills: 0 | Status: SHIPPED | OUTPATIENT
Start: 2023-11-13 | End: 2023-11-20

## 2023-11-13 RX ORDER — SODIUM CHLORIDE, SODIUM GLUCONATE, SODIUM ACETATE, POTASSIUM CHLORIDE AND MAGNESIUM CHLORIDE 30; 37; 368; 526; 502 MG/100ML; MG/100ML; MG/100ML; MG/100ML; MG/100ML
INJECTION, SOLUTION INTRAVENOUS CONTINUOUS
Status: CANCELLED | OUTPATIENT
Start: 2023-11-13 | End: 2023-12-13

## 2023-11-13 RX ORDER — KETOROLAC TROMETHAMINE 10 MG/1
10 TABLET, FILM COATED ORAL EVERY 6 HOURS
Qty: 20 TABLET | Refills: 0 | Status: SHIPPED | OUTPATIENT
Start: 2023-11-13 | End: 2023-11-18

## 2023-11-13 RX ORDER — MORPHINE SULFATE 4 MG/ML
3 INJECTION, SOLUTION INTRAMUSCULAR; INTRAVENOUS
Status: DISCONTINUED | OUTPATIENT
Start: 2023-11-13 | End: 2023-11-13 | Stop reason: HOSPADM

## 2023-11-13 RX ORDER — MELOXICAM 7.5 MG/1
TABLET ORAL
Qty: 30 TABLET | Refills: 0 | Status: SHIPPED | OUTPATIENT
Start: 2023-11-19 | End: 2023-11-28

## 2023-11-13 RX ORDER — HYDROCODONE BITARTRATE AND ACETAMINOPHEN 5; 325 MG/1; MG/1
1 TABLET ORAL EVERY 4 HOURS PRN
Status: DISCONTINUED | OUTPATIENT
Start: 2023-11-13 | End: 2023-11-13 | Stop reason: HOSPADM

## 2023-11-13 RX ORDER — GABAPENTIN 300 MG/1
300 CAPSULE ORAL EVERY 8 HOURS
Qty: 15 CAPSULE | Refills: 0 | Status: SHIPPED | OUTPATIENT
Start: 2023-11-13 | End: 2023-11-18

## 2023-11-13 RX ORDER — GLYCOPYRROLATE 0.2 MG/ML
INJECTION INTRAMUSCULAR; INTRAVENOUS
Status: DISCONTINUED | OUTPATIENT
Start: 2023-11-13 | End: 2023-11-13

## 2023-11-13 RX ORDER — ONDANSETRON 2 MG/ML
4 INJECTION INTRAMUSCULAR; INTRAVENOUS DAILY PRN
Status: CANCELLED | OUTPATIENT
Start: 2023-11-13

## 2023-11-13 RX ORDER — MIDAZOLAM HYDROCHLORIDE 1 MG/ML
2 INJECTION INTRAMUSCULAR; INTRAVENOUS ONCE AS NEEDED
Status: CANCELLED | OUTPATIENT
Start: 2023-11-13 | End: 2035-04-10

## 2023-11-13 RX ORDER — LIDOCAINE HYDROCHLORIDE 20 MG/ML
INJECTION INTRAVENOUS
Status: DISCONTINUED | OUTPATIENT
Start: 2023-11-13 | End: 2023-11-13

## 2023-11-13 RX ORDER — ONDANSETRON 2 MG/ML
4 INJECTION INTRAMUSCULAR; INTRAVENOUS EVERY 6 HOURS PRN
Status: DISCONTINUED | OUTPATIENT
Start: 2023-11-13 | End: 2023-11-13 | Stop reason: HOSPADM

## 2023-11-13 RX ORDER — ACETAMINOPHEN 500 MG
1000 TABLET ORAL
Status: COMPLETED | OUTPATIENT
Start: 2023-11-13 | End: 2023-11-13

## 2023-11-13 RX ORDER — METHOCARBAMOL 500 MG/1
1000 TABLET, FILM COATED ORAL EVERY 6 HOURS PRN
Status: DISCONTINUED | OUTPATIENT
Start: 2023-11-13 | End: 2023-11-13 | Stop reason: HOSPADM

## 2023-11-13 RX ORDER — SODIUM CHLORIDE 9 MG/ML
INJECTION, SOLUTION INTRAVENOUS CONTINUOUS
Status: DISCONTINUED | OUTPATIENT
Start: 2023-11-13 | End: 2023-11-13 | Stop reason: HOSPADM

## 2023-11-13 RX ORDER — KETOROLAC TROMETHAMINE 10 MG/1
10 TABLET, FILM COATED ORAL ONCE
Status: COMPLETED | OUTPATIENT
Start: 2023-11-13 | End: 2023-11-13

## 2023-11-13 RX ADMIN — ACETAMINOPHEN 1000 MG: 500 TABLET ORAL at 06:11

## 2023-11-13 RX ADMIN — SODIUM CHLORIDE, SODIUM GLUCONATE, SODIUM ACETATE, POTASSIUM CHLORIDE AND MAGNESIUM CHLORIDE: 526; 502; 368; 37; 30 INJECTION, SOLUTION INTRAVENOUS at 06:11

## 2023-11-13 RX ADMIN — PROPOFOL 150 MG: 10 INJECTION, EMULSION INTRAVENOUS at 07:11

## 2023-11-13 RX ADMIN — ONDANSETRON HYDROCHLORIDE 4 MG: 2 SOLUTION INTRAMUSCULAR; INTRAVENOUS at 08:11

## 2023-11-13 RX ADMIN — KETOROLAC TROMETHAMINE 10 MG: 10 TABLET, FILM COATED ORAL at 06:11

## 2023-11-13 RX ADMIN — MIDAZOLAM 2 MG: 1 INJECTION INTRAMUSCULAR; INTRAVENOUS at 06:11

## 2023-11-13 RX ADMIN — CEFAZOLIN 2 G: 2 INJECTION, POWDER, FOR SOLUTION INTRAMUSCULAR; INTRAVENOUS at 07:11

## 2023-11-13 RX ADMIN — FENTANYL CITRATE 100 MCG: 50 INJECTION, SOLUTION INTRAMUSCULAR; INTRAVENOUS at 07:11

## 2023-11-13 RX ADMIN — SUGAMMADEX 150 MG: 100 INJECTION, SOLUTION INTRAVENOUS at 08:11

## 2023-11-13 RX ADMIN — LIDOCAINE HYDROCHLORIDE 50 MG: 20 INJECTION INTRAVENOUS at 07:11

## 2023-11-13 RX ADMIN — GLYCOPYRROLATE 0.2 MG: 0.2 INJECTION INTRAMUSCULAR; INTRAVENOUS at 07:11

## 2023-11-13 RX ADMIN — ROCURONIUM BROMIDE 50 MG: 10 SOLUTION INTRAVENOUS at 07:11

## 2023-11-13 RX ADMIN — GABAPENTIN 300 MG: 300 CAPSULE ORAL at 06:11

## 2023-11-13 RX ADMIN — ROPIVACAINE HYDROCHLORIDE 30 ML: 5 INJECTION, SOLUTION EPIDURAL; INFILTRATION; PERINEURAL at 06:11

## 2023-11-13 RX ADMIN — DEXAMETHASONE SODIUM PHOSPHATE 4 MG: 4 INJECTION, SOLUTION INTRA-ARTICULAR; INTRALESIONAL; INTRAMUSCULAR; INTRAVENOUS; SOFT TISSUE at 07:11

## 2023-11-13 RX ADMIN — SODIUM CHLORIDE, SODIUM GLUCONATE, SODIUM ACETATE, POTASSIUM CHLORIDE AND MAGNESIUM CHLORIDE: 526; 502; 368; 37; 30 INJECTION, SOLUTION INTRAVENOUS at 07:11

## 2023-11-13 NOTE — PLAN OF CARE
Phase II discharge criteria met, Pt stable for discharge d/c instructions given verbalized understanding

## 2023-11-13 NOTE — ANESTHESIA PROCEDURE NOTES
Intubation    Date/Time: 11/13/2023 7:27 AM    Performed by: Elis Bolanos CRNA  Authorized by: Bj Qunitero MD    Intubation:     Induction:  Intravenous    Intubated:  Postinduction    Mask Ventilation:  Easy mask    Attempts:  1    Attempted By:  CRNA    Method of Intubation:  Direct    Blade:  Urias 2    Laryngeal View Grade: Grade I - full view of cords      Difficult Airway Encountered?: No      Complications:  None    Airway Device:  Oral endotracheal tube    Airway Device Size:  7.0    Style/Cuff Inflation:  Cuffed (inflated to minimal occlusive pressure)    Inflation Amount (mL):  7    Tube secured:  22    Secured at:  The lips    Placement Verified By:  Capnometry    Complicating Factors:  None    Findings Post-Intubation:  BS equal bilateral and atraumatic/condition of teeth unchanged

## 2023-11-13 NOTE — ANESTHESIA POSTPROCEDURE EVALUATION
Anesthesia Post Evaluation    Patient: Renetta Kern    Procedure(s) Performed: Procedure(s) (LRB):  ARTHROSCOPY,SHOULDER,WITH BICEPS TENODESIS (Right)    Final Anesthesia Type: general      Patient location during evaluation: PACU  Patient participation: Yes- Able to Participate  Level of consciousness: awake and alert  Post-procedure vital signs: reviewed and stable  Pain management: adequate  Airway patency: patent      Anesthetic complications: no      Cardiovascular status: blood pressure returned to baseline  Respiratory status: unassisted  Hydration status: euvolemic  Follow-up not needed.          Vitals Value Taken Time   /82 11/13/23 1031   Temp 36 °C (96.8 °F) 11/13/23 0931   Pulse 77 11/13/23 1033   Resp 18 11/13/23 1033   SpO2 99 % 11/13/23 1033         Event Time   Out of Recovery 09:29:00         Pain/Lindy Score: Pain Rating Prior to Med Admin: 5 (11/13/2023  6:20 AM)  Lindy Score: 10 (11/13/2023  9:27 AM)

## 2023-11-13 NOTE — TRANSFER OF CARE
Anesthesia Transfer of Care Note    Patient: Renetta Kern    Procedure(s) Performed: Procedure(s) (LRB):  ARTHROSCOPY,SHOULDER,WITH BICEPS TENODESIS (Right)    Patient location: PACU    Anesthesia Type: general    Transport from OR: Transported from OR on room air with adequate spontaneous ventilation    Post pain: adequate analgesia    Post assessment: no apparent anesthetic complications and tolerated procedure well    Post vital signs: stable    Level of consciousness: responds to stimulation    Nausea/Vomiting: no nausea/vomiting    Complications: none    Transfer of care protocol was followed      Last vitals: Visit Vitals  BP 94/67   Pulse 86   Temp 36.2 °C (97.2 °F)   Resp 15   Ht 5' (1.524 m)   Wt 63.4 kg (139 lb 12.4 oz)   SpO2 100%   Breastfeeding No   BMI 27.30 kg/m²

## 2023-11-13 NOTE — OR NURSING
0648  procedure time out performed for rt supraclav block, all agree  0653  started  0654  u/s guided rt supraclav block completed, pt chucho well, vss, resp full and regular, continuous monitoring.

## 2023-11-13 NOTE — ANESTHESIA PREPROCEDURE EVALUATION
"                                                                                                             11/13/2023  Renetta Kern is a 43 y.o., female.    "  History of Present Illness:     This is a 43 y.o. year old female presenting with complaints of right shoulder pain for a few years.  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  Her pain is most notable in the shoulder, however, it does radiate down her arm sometimes.  This shoulder pain is moderate to severe.  Patient reports increased pain with overhead movement.  Patient reports night pain.  Patient has been treated previously with multiple chiropractic manipulations, oral anti-inflammatories, and multiple oral corticosteroid prescriptions.  She remains symptomatic despite numerous conservative treatments.  11/07/2023 Patient presents for review of right shoulder MRI results.  MRI of the right shoulder demonstrated rotator cuff thinning with interstitial tearing and tendinosis, as well as bicipital tendonitis.  Her symptoms are unchanged since her prior visit."         Pre-op Assessment    I have reviewed the Patient Summary Reports.     I have reviewed the Nursing Notes. I have reviewed the NPO Status.   I have reviewed the Medications.     Review of Systems  Anesthesia Hx:  No problems with previous Anesthesia             Denies Family Hx of Anesthesia complications.    Denies Personal Hx of Anesthesia complications.                    Cardiovascular:  Cardiovascular Normal Exercise tolerance: good          Denies Angina.                                  Pulmonary:    Asthma                    Neurological:      Headaches                                     Physical Exam  General: Well nourished, Cooperative, Alert and Oriented    Airway:  Mallampati: II   Mouth Opening: Normal  TM Distance: Normal  Tongue: Normal  Neck ROM: Normal " ROM    Dental:  Intact    Chest/Lungs:  Clear to auscultation, Normal Respiratory Rate    Heart:  Rate: Normal  Rhythm: Regular Rhythm        Anesthesia Plan  Type of Anesthesia, risks & benefits discussed:    Anesthesia Type: Gen ETT  Intra-op Monitoring Plan: Standard ASA Monitors  Post Op Pain Control Plan: multimodal analgesia, IV/PO Opioids PRN and peripheral nerve block  Induction:  IV  Airway Plan: Direct, Post-Induction  Informed Consent: Informed consent signed with the Patient and all parties understand the risks and agree with anesthesia plan.  All questions answered.   ASA Score: 2  Day of Surgery Review of History & Physical: H&P Update referred to the surgeon/provider.    Ready For Surgery From Anesthesia Perspective.     .

## 2023-11-13 NOTE — OP NOTE
11/13/2023    PRIMARY SURGEON: Vaughn Morfin MD    ASSISTANT: Nandini Hernandez NP was imperative to the critical parts of the surgical case.  This included the surgical approach and dissection, retraction, placement of hardware and implants, and closure.    PREOPERATIVE DIAGNOSIS:  1.  Right rotator cuff tendinosis/interstitial tearing  2.  Acromioclavicular arthrosis    POSTOPERATIVE DIAGNOSIS:  1.  Right rotator cuff tendinosis/interstitial tearing  2.  Acromioclavicular arthrosis  3.  Intra-articular biceps tendon tendinosis and medial subluxation    PROCEDURES:  1.  Right diagnostic shoulder arthroscopy  2.  Arthroscopic biceps tenodesis  3.  Distal clavicle resection  4.  Arthroscopic rotator cuff debridement with PRP (platelet rich plasma) injection    ANESTHESIA:  General anesthesia with supraclavicular nerve block    BLOOD LOSS:  Less than 10 cc    DVT PROPHYLAXIS:  Aspirin for 4-6 weeks    OUTCOME:  Patient tolerated treatment/procedure well without complications and is now ready for discharge.    DISPOSITION: Home/SelfCare    FOLLOW UP: In clinic    INSTRUMENTATION:  Biceps tenodesis: Arthrex 3.9 mm BioComposite SwiveLock anchor   Implant Name Type Inv. Item Serial No.  Lot No. LRB No. Used Action   SYS IMP SWVLOK 3.9 BC - [id number]  SYS IMP SWVLOK 3.9 BC  ARTHREX  Right 1 Implanted       PROCEDURE IN DETAIL:    Diagnostic arthroscopy of shoulder    The examination under anesthesia was performed for skin defects and other contraindications and none were found.The patient was carefully placed in a lateral decubitus position. All bony prominences were padded and sterile U-drape was applied. Patients operative extremity was placed in suspension device with 7-10lbs of weight applied. The skin was prepped in normal sterile fashion. A time out was performed to confirm correct operative extremity, allergies, and antibiotic infusion. All portals were made with an 11-blade and an 18-gauge spinal needle  was used to help probe and find proper alignment for the portals. After creating posterior portal, an arthroscope was inserted into the glenohumeral joint. A diagnostic arthroscopy was then performed in this sequential order: biceps anchor, rotator interval, subscapularis tendon, superior glenohumeral ligament, middle glenohumeral ligament, inferior glenohumeral ligament, anterior and inferior capsular attachments, anterior, inferior, and posterior labrum, teres minor tendon, infraspinatus tendon, and supraspinatus tendon, and biceps tendon in the rotator interval.    The certified assistant provided critical assistance by holding instruments including the arthroscope to provide adequate visualization of the procedure, as well as assisting in wound closure.    At the end of the procedure the portals were closed with mastisol around the wound and placement of steri-strips. The patient tolerated the procedure well and taken to the recovery room in good condition.      Arthroscopic Biceps Tenodesis    Viewing through posterior portal, I noticed that there was fraying at the base of the biceps attachment as well as biceps tendon medialization/subluxation. A loop suture was passed around the midportion of the intra-articular portion of the biceps tendon, working through the anterior portal.  I then pierced through the tendon and grabbed the distal end of the loop suture and pulled it through the tendon to snag the bicep tendon. I then cut the biceps tendon at the base as it attaches to the superior labrum.  And then placed the anterior portal just superior to the subscapularis tendon as it attaches to the humeral head.  I then used a tap.  I then placed the suture through the anchor and then placed the anchor into the tapped hole. I then probed the tenodesis to make sure it was secure.    Distal clavicle resection    A distal clavicle resection was then carried out. Viewing from the posterior portal, the shaver was used  to debride the acromioclavicular joint and associated soft tissues through the anterior portal. Once cleared of the soft tissue, the tay was used to resect the distal end of the clavicle concentrating initially upon the inferior and distal clavicular spurs. The resection was then carried superiorly to remove the upper end of the clavicle to remove approximately 5-7mm of spurs. Then the tay was used on the acromion to remove 2-3mm of bone. The resection was then measured with a probe to confirm the 8-10mm of bone was resected.     Rotator cuff debridement with PRP    Partial tearing and tendinosis was noted of the rotator cuff tendon. The partial tear was less than 50% of the tendon thickness (< 6mm). A motorized shaver was introduced through the anterior portal and the area of partial tearing was debrided to a stable base.     I then placed an 18-gauge spinal needle into the supraspinatus and infraspinatus tendons.  I removed all of the inflow and outflow tubings and injected platelet rich plasma into that site.

## 2023-11-13 NOTE — ANESTHESIA PROCEDURE NOTES
Peripheral Block    Patient location during procedure: pre-op   Block not for primary anesthetic.  Reason for block: at surgeon's request and post-op pain management   Post-op Pain Location: right shoulder   Start time: 11/13/2023 6:53 AM  Timeout: 11/13/2023 6:48 AM   End time: 11/13/2023 6:54 AM    Staffing  Authorizing Provider: Bj Quintero MD  Performing Provider: Bj Quintero MD    Staffing  Performed by: Bj Quintero MD  Authorized by: Bj Quintero MD    Preanesthetic Checklist  Completed: patient identified, IV checked, site marked, risks and benefits discussed, surgical consent, monitors and equipment checked, pre-op evaluation and timeout performed  Peripheral Block  Patient position: sitting  Prep: ChloraPrep  Patient monitoring: heart rate, cardiac monitor, continuous pulse ox, continuous capnometry and frequent blood pressure checks  Block type: interscalene  Laterality: right  Injection technique: single shot  Needle  Needle type: Stimuplex   Needle gauge: 22 G  Needle length: 2 in  Needle localization: anatomical landmarks and ultrasound guidance   -ultrasound image captured on disc.  Assessment  Injection assessment: negative aspiration, negative parasthesia and local visualized surrounding nerve  Paresthesia pain: none  Heart rate change: no  Slow fractionated injection: yes    Medications:    Medications: ropivacaine (NAROPIN) injection 0.5% - Perineural   30 mL - 11/13/2023 6:54:00 AM    Additional Notes  VSS.  [facility] RN monitoring vitals throughout procedure.  Patient tolerated procedure well.

## 2023-11-14 VITALS
TEMPERATURE: 97 F | WEIGHT: 139.75 LBS | DIASTOLIC BLOOD PRESSURE: 82 MMHG | HEIGHT: 60 IN | BODY MASS INDEX: 27.44 KG/M2 | SYSTOLIC BLOOD PRESSURE: 116 MMHG | HEART RATE: 77 BPM | OXYGEN SATURATION: 99 % | RESPIRATION RATE: 18 BRPM

## 2023-11-16 ENCOUNTER — OFFICE VISIT (OUTPATIENT)
Dept: ORTHOPEDICS | Facility: CLINIC | Age: 43
End: 2023-11-16
Payer: COMMERCIAL

## 2023-11-16 DIAGNOSIS — M67.813 BICEPS TENDONOSIS OF RIGHT SHOULDER: ICD-10-CM

## 2023-11-16 DIAGNOSIS — Z98.890 STATUS POST ARTHROSCOPY OF RIGHT SHOULDER: Primary | ICD-10-CM

## 2023-11-16 PROCEDURE — 99024 POSTOP FOLLOW-UP VISIT: CPT | Mod: ,,, | Performed by: ORTHOPAEDIC SURGERY

## 2023-11-16 PROCEDURE — 99024 PR POST-OP FOLLOW-UP VISIT: ICD-10-PCS | Mod: ,,, | Performed by: ORTHOPAEDIC SURGERY

## 2023-11-16 PROCEDURE — 1159F PR MEDICATION LIST DOCUMENTED IN MEDICAL RECORD: ICD-10-PCS | Mod: CPTII,,, | Performed by: ORTHOPAEDIC SURGERY

## 2023-11-16 PROCEDURE — 1159F MED LIST DOCD IN RCRD: CPT | Mod: CPTII,,, | Performed by: ORTHOPAEDIC SURGERY

## 2023-11-16 NOTE — PROGRESS NOTES
[facility] Clinic  Orthopedic Clinic Note      Chief Complaint:   Chief Complaint   Patient presents with    Post-op Evaluation     post op @ [facility] right biceps tenodesis sx 23 GL 23     Referring Physician: No ref. provider found      History of Present Illness:    PROCEDURES:  1.  Right diagnostic shoulder arthroscopy  2.  Arthroscopic biceps tenodesis  3.  Distal clavicle resection  4.  Arthroscopic rotator cuff debridement with PRP (platelet rich plasma) injection  2023 this patient is approximately 3 days out from the above-noted procedure.  No major issues or complaints.      Past Medical History:   Diagnosis Date    Asthma     Migraine     Rotator cuff dysfunction     Seasonal allergies        Past Surgical History:   Procedure Laterality Date    ARTHROSCOPIC REPAIR OF ROTATOR CUFF OF SHOULDER Right 2023    Procedure: REPAIR, ROTATOR CUFF, ARTHROSCOPIC;  Surgeon: Vaughn Morfin MD;  Location: [facility];  Service: Orthopedics;  Laterality: Right;    ARTHROSCOPY,SHOULDER,WITH BICEPS TENODESIS Right 2023    Procedure: ARTHROSCOPY,SHOULDER,WITH BICEPS TENODESIS;  Surgeon: Vaughn Morfin MD;  Location: [facility];  Service: Orthopedics;  Laterality: Right;    BILATERAL TUBAL LIGATION       SECTION      COLONOSCOPY      EXCISION, CLAVICLE Right 2023    Procedure: EXCISION, CLAVICLE;  Surgeon: Vaughn Morfin MD;  Location: [facility] OR;  Service: Orthopedics;  Laterality: Right;    HEMORRHOID SURGERY      HYSTERECTOMY N/A     partial    INJECTION,PLATELET RICH PLASMA Right 2023    Procedure: INJECTION,PLATELET RICH PLASMA;  Surgeon: Vaughn Morfin MD;  Location: [facility];  Service: Orthopedics;  Laterality: Right;       Current Outpatient Medications   Medication Sig    albuterol (ACCUNEB) 0.63 mg/3 mL Nebu Take 0.63 mg by nebulization every 6 (six) hours as needed. Rescue    busPIRone (BUSPAR) 5 MG Tab Take 5 mg by mouth as needed.    butalbital-acetaminophen-caffeine -40  mg (FIORICET, ESGIC) -40 mg per tablet Take 1 tablet by mouth every 6 (six) hours as needed.    gabapentin (NEURONTIN) 300 MG capsule Take 1 capsule (300 mg total) by mouth every 8 (eight) hours. for 5 days    ketorolac (TORADOL) 10 mg tablet Take 1 tablet (10 mg total) by mouth every 6 (six) hours. for 5 days    [START ON 11/19/2023] meloxicam (MOBIC) 7.5 MG tablet Starting on Post-op Day 6, take Mobic 7.5mg twice a day for 9 days. After 9 days, take Daily, AS NEEDED for pain    methocarbamoL (ROBAXIN) 750 MG Tab Take 1 tablet (750 mg total) by mouth every 6 (six) hours. for 14 days    montelukast (SINGULAIR) 10 mg tablet Take 1 tablet by mouth daily as needed.    ondansetron (ZOFRAN-ODT) 4 MG TbDL Take 1 tablet (4 mg total) by mouth every 6 (six) hours as needed (Nausea/Vomiting).    oxyCODONE (ROXICODONE) 5 MG immediate release tablet Take 1 tablet (5 mg total) by mouth every 12 (twelve) hours as needed (For BREAKTHROUGH PAIN ONLY).     No current facility-administered medications for this visit.       Review of patient's allergies indicates:   Allergen Reactions    Iodine Rash and Other (See Comments)     Blisters       Family History   Problem Relation Age of Onset    No Known Problems Mother     No Known Problems Father        Social History     Socioeconomic History    Marital status:    Tobacco Use    Smoking status: Never    Smokeless tobacco: Never   Substance and Sexual Activity    Alcohol use: Not Currently    Drug use: Never    Sexual activity: Yes         Review of Systems:    All review of systems negative except for those stated in the HPI    Examination:    Vital Signs:  There were no vitals filed for this visit.    There is no height or weight on file to calculate BMI.    Physical Exam:   General: Well-developed, well-nourished.  Neuro: Alert and oriented x 3.  Psych: Normal mood and affect.  Card: Regular rate and rhythm  Resp: Respirations regular and unlabored  Incisions clean, dry,  and intact. No signs of infection. Neurovascular intact distally.       Assessment: Status post arthroscopy of right shoulder    Biceps tendonosis of right shoulder        Plan:    I reviewed the arthroscopic videos with the patient and fully explained surgical procedure.  Patient will continue with physical therapy.  Patient will follow up in 6 weeks.                No follow-ups on file.    DISCLAIMER: This note may have been dictated using voice recognition software and may contain grammatical errors.     NOTE: Consult report sent to referring provider via [employer].

## 2024-01-11 ENCOUNTER — OFFICE VISIT (OUTPATIENT)
Dept: ORTHOPEDICS | Facility: CLINIC | Age: 44
End: 2024-01-11
Payer: COMMERCIAL

## 2024-01-11 VITALS
BODY MASS INDEX: 28.07 KG/M2 | HEIGHT: 60 IN | SYSTOLIC BLOOD PRESSURE: 116 MMHG | HEART RATE: 76 BPM | WEIGHT: 143 LBS | DIASTOLIC BLOOD PRESSURE: 80 MMHG

## 2024-01-11 DIAGNOSIS — M67.813 BICEPS TENDONOSIS OF RIGHT SHOULDER: Primary | ICD-10-CM

## 2024-01-11 DIAGNOSIS — Z98.890 STATUS POST ARTHROSCOPY OF RIGHT SHOULDER: ICD-10-CM

## 2024-01-11 PROCEDURE — 99024 POSTOP FOLLOW-UP VISIT: CPT | Mod: ,,, | Performed by: ORTHOPAEDIC SURGERY

## 2024-01-11 PROCEDURE — 1159F MED LIST DOCD IN RCRD: CPT | Mod: CPTII,,, | Performed by: ORTHOPAEDIC SURGERY

## 2024-01-11 PROCEDURE — 3074F SYST BP LT 130 MM HG: CPT | Mod: CPTII,,, | Performed by: ORTHOPAEDIC SURGERY

## 2024-01-11 PROCEDURE — 3079F DIAST BP 80-89 MM HG: CPT | Mod: CPTII,,, | Performed by: ORTHOPAEDIC SURGERY

## 2024-01-11 PROCEDURE — 1160F RVW MEDS BY RX/DR IN RCRD: CPT | Mod: CPTII,,, | Performed by: ORTHOPAEDIC SURGERY

## 2024-01-11 RX ORDER — MELOXICAM 15 MG/1
15 TABLET ORAL DAILY
Qty: 30 TABLET | Refills: 1 | Status: SHIPPED | OUTPATIENT
Start: 2024-01-11

## 2024-01-11 RX ORDER — OXYCODONE HYDROCHLORIDE 5 MG/1
TABLET ORAL
COMMUNITY
Start: 2023-11-13

## 2024-01-11 NOTE — PROGRESS NOTES
Orthopaedic Clinic  Orthopedic Clinic Note      Chief Complaint:   Chief Complaint   Patient presents with    Follow-up     8wk sp right shoulder ATS RTC repair with biceps tenodesis with PRP sx 23. Pt states some discomfort and pain located near posterior shoulder and tricep/bicep area. States pain is mainly when trying to bend her arm or flex. Missed some therapy visits due to work. She states they would not allow her off for PT visits. Would like advice on therapy and medications.     Referring Physician: No ref. provider found      History of Present Illness:    PROCEDURES:  1.  Right diagnostic shoulder arthroscopy  2.  Arthroscopic biceps tenodesis  3.  Distal clavicle resection  4.  Arthroscopic rotator cuff debridement with PRP (platelet rich plasma) injection  2023 this patient is approximately 3 days out from the above-noted procedure.  No major issues or complaints.  2024 patient presents 8 weeks status post the above-noted procedure.  She is doing well with no major issues or concerns.  She does continue to have some discomfort in her triceps/biceps.  Attending formal physical therapy, but she has had 2 missed a few sessions secondary to work.      Past Medical History:   Diagnosis Date    Asthma     Migraine     Rotator cuff dysfunction     Seasonal allergies        Past Surgical History:   Procedure Laterality Date    ARTHROSCOPIC REPAIR OF ROTATOR CUFF OF SHOULDER Right 2023    Procedure: REPAIR, ROTATOR CUFF, ARTHROSCOPIC;  Surgeon: Vaughn Morfin MD;  Location: [facility];  Service: Orthopedics;  Laterality: Right;    ARTHROSCOPY,SHOULDER,WITH BICEPS TENODESIS Right 2023    Procedure: ARTHROSCOPY,SHOULDER,WITH BICEPS TENODESIS;  Surgeon: Vaughn Morfin MD;  Location: [facility];  Service: Orthopedics;  Laterality: Right;    BILATERAL TUBAL LIGATION       SECTION      COLONOSCOPY      EXCISION, CLAVICLE Right 2023    Procedure: EXCISION, CLAVICLE;  Surgeon:  Vaughn Morfin MD;  Location: [facility];  Service: Orthopedics;  Laterality: Right;    HEMORRHOID SURGERY      HYSTERECTOMY N/A     partial    INJECTION,PLATELET RICH PLASMA Right 11/13/2023    Procedure: INJECTION,PLATELET RICH PLASMA;  Surgeon: Vaughn Morfin MD;  Location: [facility];  Service: Orthopedics;  Laterality: Right;       Current Outpatient Medications   Medication Sig    albuterol (ACCUNEB) 0.63 mg/3 mL Nebu Take 0.63 mg by nebulization every 6 (six) hours as needed. Rescue    busPIRone (BUSPAR) 5 MG Tab Take 5 mg by mouth as needed.    butalbital-acetaminophen-caffeine -40 mg (FIORICET, ESGIC) -40 mg per tablet Take 1 tablet by mouth every 6 (six) hours as needed.    montelukast (SINGULAIR) 10 mg tablet Take 1 tablet by mouth daily as needed.    oxyCODONE (ROXICODONE) 5 MG immediate release tablet     gabapentin (NEURONTIN) 300 MG capsule Take 1 capsule (300 mg total) by mouth every 8 (eight) hours. for 5 days    meloxicam (MOBIC) 15 MG tablet Take 1 tablet (15 mg total) by mouth once daily. take with food     No current facility-administered medications for this visit.       Review of patient's allergies indicates:   Allergen Reactions    Iodine Rash and Other (See Comments)     Blisters       Family History   Problem Relation Age of Onset    No Known Problems Mother     No Known Problems Father        Social History     Socioeconomic History    Marital status:    Tobacco Use    Smoking status: Never    Smokeless tobacco: Never   Substance and Sexual Activity    Alcohol use: Not Currently    Drug use: Never    Sexual activity: Yes         Review of Systems:    All review of systems negative except for those stated in the HPI    Examination:    Vital Signs:    Vitals:    01/11/24 0811   BP: 116/80   Pulse: 76   Weight: 64.9 kg (143 lb)   Height: 5' (1.524 m)       Body mass index is 27.93 kg/m².    Physical Exam:   General: Well-developed, well-nourished.  Neuro: Alert and oriented x  3.  Psych: Normal mood and affect.  Card: Regular rate and rhythm  Resp: Respirations regular and unlabored  Right Shoulder exam:   Forward flexion to 140 degrees. 3/5 strength.  Neurovascular intact distally. Sensation intact.       Assessment: Biceps tendonosis of right shoulder  -     meloxicam (MOBIC) 15 MG tablet; Take 1 tablet (15 mg total) by mouth once daily. take with food  Dispense: 30 tablet; Refill: 1    Status post arthroscopy of right shoulder  -     meloxicam (MOBIC) 15 MG tablet; Take 1 tablet (15 mg total) by mouth once daily. take with food  Dispense: 30 tablet; Refill: 1        Plan:  She will continue formal physical therapy.  We discussed that consistent PT sessions will allow her to return to her normal activities more quickly and effectively, as well as prevent postoperative complications.  I would like her to work from home until follow-up in order to allow for consistent physical therapy.  Prescription routed for meloxicam to be taken once daily as needed for pain.  Advised that she avoid all other over-the-counter anti-inflammatories while taking this medication.  She will return to clinic in approximately 6-8 weeks for re-evaluation.  She verbalized understanding of the plan of care with no further questions.    Vaughn Morfin MD personally performed the services described in this documentation, including but not limited to patient's history, physical examination, and assessment and plan of care. All medical record entries made by KELSEY Rodrigez were performed at his direction and in his presence. The medical record was reviewed and is accurate and complete.          Follow up in about 8 weeks (around 3/7/2024) for Reevaluation.    DISCLAIMER: This note may have been dictated using voice recognition software and may contain grammatical errors.     NOTE: Consult report sent to referring provider via [employer].

## 2024-01-11 NOTE — LETTER
January 11, 2024       [facility]  [address], [address]  [place] [address]  Phone: [phone]  Fax: [phone]       Patient: Renetta Kern   YOB: 1980  Date of Visit: 01/11/2024    To Whom It May Concern:    Dimple Kern  was at [facility] on 01/11/2024. The patient may return to work on 01/12/2024 . If you have any questions or concerns, or if I can be of further assistance, please do not hesitate to contact me.    Sincerely,    Vaughn Morfin M.D.     
January 11, 2024    Renetta Kern  [address] [address]          [facility]  [address], [address]  [place] [address]  Phone: [phone]  Fax: [phone] January 11, 2024     Patient: Renetta Kern   YOB: 1980   Date of Visit: 1/11/2024       To Whom It May Concern:    It is my medical opinion that Renetta Kern  should work from home until after we see her for her next follow up 02/27/2024 .    If you have any questions or concerns, please don't hesitate to call.    Sincerely,        Vaughn Morfin MD     
Yes

## 2024-02-27 ENCOUNTER — OFFICE VISIT (OUTPATIENT)
Dept: ORTHOPEDICS | Facility: CLINIC | Age: 44
End: 2024-02-27
Payer: COMMERCIAL

## 2024-02-27 VITALS
HEIGHT: 62 IN | HEART RATE: 85 BPM | SYSTOLIC BLOOD PRESSURE: 96 MMHG | BODY MASS INDEX: 26.09 KG/M2 | DIASTOLIC BLOOD PRESSURE: 67 MMHG | WEIGHT: 141.81 LBS

## 2024-02-27 DIAGNOSIS — Z98.890 STATUS POST ARTHROSCOPY OF RIGHT SHOULDER: Primary | ICD-10-CM

## 2024-02-27 PROCEDURE — 3078F DIAST BP <80 MM HG: CPT | Mod: CPTII,,, | Performed by: ORTHOPAEDIC SURGERY

## 2024-02-27 PROCEDURE — 1160F RVW MEDS BY RX/DR IN RCRD: CPT | Mod: CPTII,,, | Performed by: ORTHOPAEDIC SURGERY

## 2024-02-27 PROCEDURE — 3008F BODY MASS INDEX DOCD: CPT | Mod: CPTII,,, | Performed by: ORTHOPAEDIC SURGERY

## 2024-02-27 PROCEDURE — 99213 OFFICE O/P EST LOW 20 MIN: CPT | Mod: ,,, | Performed by: ORTHOPAEDIC SURGERY

## 2024-02-27 PROCEDURE — 3074F SYST BP LT 130 MM HG: CPT | Mod: CPTII,,, | Performed by: ORTHOPAEDIC SURGERY

## 2024-02-27 PROCEDURE — 1159F MED LIST DOCD IN RCRD: CPT | Mod: CPTII,,, | Performed by: ORTHOPAEDIC SURGERY

## 2024-02-27 NOTE — PROGRESS NOTES
[facility]  Orthopedic Clinic Note      Chief Complaint:   Chief Complaint   Patient presents with    Right Shoulder - Post-op Evaluation     3 month sp f/u right RTC repair & biceps tenodesis sx 23. Denies pain, everything going good. Mobic PRN with relief.        Referring Physician: No ref. provider found      History of Present Illness:    PROCEDURES:  1.  Right diagnostic shoulder arthroscopy  2.  Arthroscopic biceps tenodesis  3.  Distal clavicle resection  4.  Arthroscopic rotator cuff debridement with PRP (platelet rich plasma) injection  2023 this patient is approximately 3 days out from the above-noted procedure.  No major issues or complaints.  2024 patient presents 8 weeks status post the above-noted procedure.  She is doing well with no major issues or concerns.  She does continue to have some discomfort in her triceps/biceps.  Attending formal physical therapy, but she has had 2 missed a few sessions secondary to work.  2024 patient presents 3 months status post the above-noted procedure.  Doing well with no major issues or concerns.  She takes meloxicam sparingly as needed.  She is still participating in formal physical therapy.      Past Medical History:   Diagnosis Date    Asthma     Migraine     Rotator cuff dysfunction     Seasonal allergies        Past Surgical History:   Procedure Laterality Date    ARTHROSCOPIC REPAIR OF ROTATOR CUFF OF SHOULDER Right 2023    Procedure: REPAIR, ROTATOR CUFF, ARTHROSCOPIC;  Surgeon: Vaughn Morfin MD;  Location: [facility];  Service: Orthopedics;  Laterality: Right;    ARTHROSCOPY,SHOULDER,WITH BICEPS TENODESIS Right 2023    Procedure: ARTHROSCOPY,SHOULDER,WITH BICEPS TENODESIS;  Surgeon: Vaughn Morfin MD;  Location: [facility];  Service: Orthopedics;  Laterality: Right;    BILATERAL TUBAL LIGATION       SECTION      COLONOSCOPY      EXCISION, CLAVICLE Right 2023    Procedure: EXCISION, CLAVICLE;  Surgeon:  "Vaughn Morfin MD;  [place] [facility] [place];  Service: Orthopedics;  Laterality: Right;    HEMORRHOID SURGERY      HYSTERECTOMY N/A     partial    INJECTION,PLATELET RICH PLASMA Right 11/13/2023    Procedure: INJECTION,PLATELET RICH PLASMA;  Surgeon: Vaughn Morfin MD;  [place] [facility] [place];  Service: Orthopedics;  Laterality: Right;       Current Outpatient Medications   Medication Sig    albuterol (ACCUNEB) 0.63 mg/3 mL Nebu Take 0.63 mg by nebulization every 6 (six) hours as needed. Rescue    busPIRone (BUSPAR) 5 MG Tab Take 5 mg by mouth as needed.    butalbital-acetaminophen-caffeine -40 mg (FIORICET, ESGIC) -40 mg per tablet Take 1 tablet by mouth every 6 (six) hours as needed.    meloxicam (MOBIC) 15 MG tablet Take 1 tablet (15 mg total) by mouth once daily. take with food    collagen/biotin/ascorbic acid (COLLAGEN 1500 PLUS C ORAL) Take by mouth.    gabapentin (NEURONTIN) 300 MG capsule Take 1 capsule (300 mg total) by mouth every 8 (eight) hours. for 5 days    montelukast (SINGULAIR) 10 mg tablet Take 1 tablet by mouth daily as needed.    oxyCODONE (ROXICODONE) 5 MG immediate release tablet      No current facility-administered medications for this visit.       Review of patient's allergies indicates:   Allergen Reactions    Sinus allergy     Iodine Rash and Other (See Comments)     Blisters       Family History   Problem Relation Age of Onset    No Known Problems Mother     No Known Problems Father        Social History     Socioeconomic History    Marital status:    Tobacco Use    Smoking status: Never    Smokeless tobacco: Never   Substance and Sexual Activity    Alcohol use: Not Currently    Drug use: Never    Sexual activity: Yes     Partners: Male         Review of Systems:    All review of systems negative except for those stated in the HPI    Examination:    Vital Signs:    Vitals:    02/27/24 0825   BP: 96/67   Pulse: 85   Weight: 64.3 kg (141 lb 12.8 oz)   Height: 5' 2.11" (1.578 m) "       Body mass index is 25.85 kg/m².    Physical Exam:   General: Well-developed, well-nourished.  Neuro: Alert and oriented x 3.  Psych: Normal mood and affect.  Card: Regular rate and rhythm  Resp: Respirations regular and unlabored  Right Shoulder exam:   Forward flexion to 160 degrees. 4-/5 strength.  Neurovascular intact distally. Sensation intact.       Assessment: Status post arthroscopy of right shoulder        Plan:  She will continue formal physical therapy and transition to a home exercise program as able.  Continue previously prescribed medications as needed for pain.  She will return to clinic as needed for any additional issues or concerns.  She verbalized understanding of the plan of care with no further questions.    Vaughn Morfin MD personally performed the services described in this documentation, including but not limited to patient's history, physical examination, and assessment and plan of care. All medical record entries made by KELSEY Rodrigez were performed at his direction and in his presence. The medical record was reviewed and is accurate and complete.          Follow up if symptoms worsen or fail to improve.    DISCLAIMER: This note may have been dictated using voice recognition software and may contain grammatical errors.     NOTE: Consult report sent to referring provider via [employer].

## (undated) DEVICE — NDL ANES SPINAL 18X3.5ST 18G

## (undated) DEVICE — KIT ARTHREX ANGEL PRP

## (undated) DEVICE — DRAPE INCISE IOBAN 2 23X23IN

## (undated) DEVICE — SLEEVE LATERAL TRACTION ARM

## (undated) DEVICE — BLADE SURG CARBON STEEL SZ11

## (undated) DEVICE — GLOVE SENSICARE PI MICRO 8.5

## (undated) DEVICE — GAUZE NON WOVEN NS 4PLY 4X4IN

## (undated) DEVICE — CANNULA TRIPLEDAM 6MM X 7CM

## (undated) DEVICE — GOWN POLY REINF X-LONG 2XL

## (undated) DEVICE — SUT MONOCRYL 3-0 PS-2 UND

## (undated) DEVICE — SET CASSETTE TUBE DW OUTFLOW

## (undated) DEVICE — BURR OVAL 8 FLUTE 4MMX13CM

## (undated) DEVICE — DRAPE SURGICAL STERI IRRG PCH

## (undated) DEVICE — DRAPE SHOULDER BEACH CHAIR

## (undated) DEVICE — CLOSURE SKIN STERI STRIP 1/2X4

## (undated) DEVICE — GLOVE SENSICARE PI GRN 9

## (undated) DEVICE — Device

## (undated) DEVICE — PAD ABD 8X10 STERILE

## (undated) DEVICE — CANNULA LOW PROFILE 7CM 5MM

## (undated) DEVICE — GLOVE SENSICARE PI GRN 6.5

## (undated) DEVICE — GLOVE SENSICARE PI MICRO 6.5

## (undated) DEVICE — SUT 0 36IN PDS II VIO MONO

## (undated) DEVICE — HOLDER STRIP-T SELF ADH 2X10IN

## (undated) DEVICE — KIT SURGICAL TURNOVER

## (undated) DEVICE — DRAPE STERI INSTRUMENT 1018

## (undated) DEVICE — DRAPE STERI U-SHAPED 47X51IN

## (undated) DEVICE — TAPE ADH MEDIPORE 4 X 10YDS

## (undated) DEVICE — TUBING PUMP ARTHROSCOPY STRL

## (undated) DEVICE — BLADE COOLCUT EXCALIBER 4X13

## (undated) DEVICE — COVER MAYO STAND REINFRCD 30

## (undated) DEVICE — SOL NACL IRR 3000ML

## (undated) DEVICE — APPLICATOR CHLORAPREP ORN 26ML

## (undated) DEVICE — HOOK APOLLORF NON ASPIR 90DEG